# Patient Record
(demographics unavailable — no encounter records)

---

## 2024-12-05 NOTE — REASON FOR VISIT
[Initial Evaluation] : an initial evaluation [Patient] : patient [Father] : father [FreeTextEntry1] : knocked knees

## 2024-12-05 NOTE — HISTORY OF PRESENT ILLNESS
[FreeTextEntry1] : 12 year old male patient ("Lopez") seen for orthopaedic evaluation of knocked knees. Dad has noticed for the past 2 years.  Dad denies knocked knees in any family members. Denies pain in his legs.

## 2024-12-05 NOTE — PHYSICAL EXAM
[FreeTextEntry1] : Gait: Presents ambulating independently without signs of antalgia.  Good coordination and balance noted. Plantigrade foot with heel-to-toe progression. Neutral foot progression angle. GENERAL: Healthy appearing. Alert, cooperative, in NAD SKIN: The skin is intact, warm, pink and dry over the area examined. EYES: Normal conjunctiva, normal eyelids and pupils were equal and round. ENT: normal ears, normal nose and normal lips. CARDIOVASCULAR: brisk capillary refill, but no peripheral edema. RESPIRATORY: The patient is in no apparent respiratory distress. They're taking full deep breaths without use of accessory muscles or evidence of audible wheezes or stridor without the use of a stethoscope. Normal respiratory effort. ABDOMEN: not examined MUSCULOSKELETAL: valgus alignment worse on the right than the left

## 2024-12-05 NOTE — DATA REVIEWED
[de-identified] : Leg length XRs taken at [facility] on 12/5/24 were viewed and independently interpreted: genu valgum BL, MAD passes through zone 2 laterally BL R LDFA measures 83.15, R MPTA measures 86.8 L LDFA measures 84.37, R MPTA measures 89.16  AP Lateral Knee XR taken in office on 12/5/24  and were viewed and independently interpreted: growth plates open; unable to determine alignment

## 2024-12-05 NOTE — END OF VISIT
[FreeTextEntry3] : A physician assistant/resident assisted with documenting the visit and acted as a scribe. I have seen and examined the patient, made my assessment and plan and have made all modifications necessary to the note.  Lauren Paulson MD Pediatric Orthopaedics Surgery [facility]  [Time Spent: ___ minutes] : I have spent [unfilled] minutes of time on the encounter which excludes teaching and separately reported services.

## 2024-12-05 NOTE — ASSESSMENT
[FreeTextEntry1] :  12 year old male patient with genu valgum.   Today's visit included obtaining the history from the child and parent, due to the child's age, the child could not be considered a reliable historian, requiring the parent to act as an independent historian. The condition, natural history, and prognosis were explained to the patient and family. The clinical findings and images were reviewed with the family.  AP Lateral Knee XR taken in office on 12/5/24  and were viewed and independently interpreted: growth plates open; unable to determine alignment  I have asked pt to obtain a leg length XR from the hospital. I emphasized the importance of standing with both of his knees straight forward. A script for this was provided. Pt can follow-up with me after the XR. Options including arlyn epiphysiodesis were discussed.   ADDENDUM: Leg length XRs taken at [facility] on 12/5/24 were viewed and independently interpreted: genu valgum BL, MAD passes through zone 2 laterally BL R LDFA measures 83.15, R MPTA measures 86.8 L LDFA measures 84.37, R MPTA measures 89.16  Discussed with patient's family. Recommend surgical intervention as an option for clinical correction, would include BL medial distal femoral epiphysiodesis.  This plan was discussed with the family. Family verbalizes understanding and agreement of plan. All questions and concerns were addressed today.  I, LORAINE ESPINOSA, acted as a scribe on behalf of DR. ABEL on 12/05/2024.

## 2024-12-19 NOTE — ASSESSMENT
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   Today's visit included obtaining the history from the child and parent, due to the child's age, the child could not be considered a reliable historian, requiring the parent to act as an independent historian. The condition, natural history, and prognosis were explained to the patient and family. The clinical findings and images were reviewed with the family.  I went over patient's leg length XRs from 12/5/24. My recommendation is surgical intervention for clinical correction including BL medial distal femoral epiphysiodesis. Benefits, risks, and expected outcomes were discussed in detail. We discussed that he would have 4 weeks of limited activity. I explained that the correction would occur when the patient is growing. Father would like to proceed with surgical intervention. Dad is going for surgery in January and would like to schedule pt's surgery in early to mid-February at [facility].   This plan was discussed with the family. Family verbalizes understanding and agreement of plan. All questions and concerns were addressed today.  I, LORAINE ESPINOSA, acted as a scribe on behalf of DR. ABEL on 12/19/2024.

## 2024-12-19 NOTE — HISTORY OF PRESENT ILLNESS
[FreeTextEntry1] : 12 year old male patient ("Lopez") seen for orthopaedic evaluation of knocked knees. Dad has noticed for the past 2 years.  Dad denies knocked knees in any family members. Denies pain in his legs.   12/19/24 -- Pt seen in follow-up. He is doing well and comes in to discuss options today. Denies any complications with anesthesia in the past. Denies any medical conditions. I explained that pt would need medical clearance from his pediatrician.

## 2024-12-19 NOTE — PHYSICAL EXAM
[FreeTextEntry1] : Gait: Presents ambulating independently without signs of antalgia.  Good coordination and balance noted. Plantigrade foot with heel-to-toe progression. Neutral foot progression angle. GENERAL: Healthy appearing. Alert, cooperative, in NAD SKIN: The skin is intact, warm, pink and dry over the area examined. EYES: Normal conjunctiva, normal eyelids and pupils were equal and round. ENT: normal ears, normal nose and normal lips. CARDIOVASCULAR: brisk capillary refill, but no peripheral edema. RESPIRATORY: The patient is in no apparent respiratory distress. They're taking full deep breaths without use of accessory muscles or evidence of audible wheezes or stridor without the use of a stethoscope. Normal respiratory effort. ABDOMEN: not examined MUSCULOSKELETAL: clinical valgus alignment worse on the right than the left

## 2024-12-19 NOTE — END OF VISIT
[Time Spent: ___ minutes] : I have spent [unfilled] minutes of time on the encounter which excludes teaching and separately reported services. [FreeTextEntry3] : A physician assistant/resident assisted with documenting the visit and acted as a scribe. I have seen and examined the patient, made my assessment and plan and have made all modifications necessary to the note.  Lauren Paulson MD Pediatric Orthopaedics Surgery [facility]

## 2024-12-19 NOTE — REASON FOR VISIT
[Follow Up] : a follow up visit [Patient] : patient [Father] : father [FreeTextEntry1] : knocked knees

## 2024-12-19 NOTE — DATA REVIEWED
[de-identified] : Leg length XRs taken at [facility] on 12/5/24 were viewed and independently interpreted: genu valgum BL, MAD passes through zone 2 laterally BL R LDFA measures 83.15, R MPTA measures 86.8 L LDFA measures 84.37, R MPTA measures 89.16  AP Lateral Knee XR taken in office on 12/5/24  and were viewed and independently interpreted: growth plates open; unable to determine alignment

## 2025-03-05 NOTE — POST OP
[___ Weeks Post Op] : [unfilled] weeks post op [Doing Well] : is doing well [Excellent Pain Control] : has excellent pain control [No Sign of Infection] : is showing no signs of infection [de-identified] : s/p right medial distal femoral arlyn epiphysiodesis, left medial distal femoral arlyn epiphysiodesis, left medial proximal tibia arlyn epiphysiodesis on 02/18/2025. [de-identified] : 12-year-old male, who presented to my office in December 2024 due to complaints of bilateral knock knee deformities.  X-rays were taken and demonstrated bilateral valgus deformities. Radiographs demonstrated that the valgus deformity was coming from the right distal femur, the left distal femur and the left proximal tibia.  Due to his x-ray findings and his skeletal immaturity, recommendation was made at that time to proceed with surgical intervention for arlyn epiphysiodesis.  The risks and benefits of surgery including, but not limited to, infection, bleeding, injury to neurovascular structures, pain, scarring, stiffness, incomplete correction, and need for additional procedures including removal of hardware was discussed with the patient's family.  They wished to proceed.  Today father reports that he is doing well. Denies any significant discomfort or pain. Denies any radiating pain or tingling sensation.  He is able to bear weight on his bilateral lower extremities. Denies any recent fevers, chills or night sweats. He is not taking any pain medication. Here for further orthopedic evaluation. [de-identified] : Bilateral lower extremities: -Incision is healing well, no signs of infection noted. -No gross deformity. -No swelling, warmth, or erythema. -All thigh compartments are soft and easily compressible. -ROM 0-115 degrees. -No knee joint effusion -Foot is warm and appears well-perfused with brisk capillary refill to all toes -2+ dorsalis pedis pulse -Sensation is grossly intact throughout lower extremity including in the deep peroneal, superficial peroneal, saphenous, sural, and tibial nerve distributions -No evidence of lymphedema.   [de-identified] : AP, lateral and oblique bilateral knee radiographs were ordered, obtained, and independently reviewed in clinic on 03/04/25 s/p bilateral arlyn epiphysiodesis. Hardware is in place. [de-identified] : 12-year-old male with 2 weeks s/p right medial distal femoral arlyn epiphysiodesis, left medial distal femoral arlyn epiphysiodesis, left medial proximal tibia arlyn epiphysiodesis on 02/18/2025. [de-identified] : Today's visit included obtaining the history from the child and parent, due to the child's age, the child could not be considered a reliable historian, requiring the parent to act as an independent historian. The condition, natural history, and prognosis were explained to the patient and family. The clinical findings and images were reviewed with the family. Clinically he is doing well no signs of infection noted. Weight bearing as tolerated. Recommended for gentle ROM. No gym, no sports, rough play until cleared by our clinic. Updated school note was provided. We will plan to see him back in 2 weeks 03/21 ([place]) for reevaluation of ROM prior to activity clearance.  All questions and concerns were addressed. Patient and parent vocalized understanding and agreement to assessment and treatment.  Rosey BELCHER have acted as scribe and documented the above for Dr. Paulson

## 2025-03-05 NOTE — END OF VISIT
[FreeTextEntry3] : A physician assistant/resident assisted with documenting the visit and acted as a scribe. I have seen and examined the patient, made my assessment and plan and have made all modifications necessary to the note.  Lauren Paulson MD Pediatric Orthopaedics Surgery [facility]

## 2025-03-05 NOTE — END OF VISIT
[FreeTextEntry3] : A physician assistant/resident assisted with documenting the visit and acted as a scribe. I have seen and examined the patient, made my assessment and plan and have made all modifications necessary to the note.  Lauren Paulson MD Pediatric Orthopaedics Surgery [facility]

## 2025-03-21 NOTE — ASSESSMENT
[FreeTextEntry1] : 12-year-old male with 2 weeks s/p right medial distal femoral arlyn epiphysiodesis, left medial distal femoral arlyn epiphysiodesis, left medial proximal tibia arlyn epiphysiodesis on 02/18/2025. Postoperatively, the patient is doing well, has excellent pain control and is showing no signs of infection.   Plan: Today's visit included obtaining the history from the child and parent, due to the child's age, the child could not be considered a reliable historian, requiring the parent to act as an independent historian. The condition, natural history, and prognosis were explained to the patient and family. The clinical findings and images were reviewed with the family. Clinically he is doing well no signs of infection noted. Weight bearing as tolerated. Cleared for sports and gym today. Updated school note was provided. We will plan to see him back in 4 months for reevaluation and alignment films of bilateral lower extremities.  All questions and concerns were addressed. Patient and parent vocalized understanding and agreement to assessment and treatment.  IAnna MD, have acted as scribe and documented the above for Dr. Paulson.

## 2025-03-21 NOTE — END OF VISIT
[FreeTextEntry3] : A physician assistant/resident assisted with documenting the visit and acted as a scribe. I have seen and examined the patient, made my assessment and plan and have made all modifications necessary to the note.  Laurne Paulson MD Pediatric Orthopaedics Surgery [facility]

## 2025-03-21 NOTE — HISTORY OF PRESENT ILLNESS
[FreeTextEntry1] : 12-year-old male, who presented to my office in December 2024 due to complaints of bilateral knock knee deformities. X-rays were taken and demonstrated bilateral valgus deformities. Radiographs demonstrated that the valgus deformity was coming from the right distal femur, the left distal femur and the left proximal tibia. Due to his x-ray findings and his skeletal immaturity, recommendation was made at that time to proceed with surgical intervention for arlyn epiphysiodesis. The risks and benefits of surgery including, but not limited to, infection, bleeding, injury to neurovascular structures, pain, scarring, stiffness, incomplete correction, and need for additional procedures including removal of hardware was discussed with the patient's family. Patient underwent R medial distal femoral hemiepiphysiodesis, Left medial distal femur/proximal tibia hemiepiphysiodesis on 2/18/25. Last visit patient was doing well, minimal pain, continued activity modification.  Please refer to last note from previous treatment and further details.  Today patient and father report that he is doing well. Denies any significant discomfort or pain. Denies any radiating pain or tingling sensation. He is able to bear weight on his bilateral lower extremities. Denies any recent fevers, chills or night sweats. He is not taking any pain medication. No pain with ROM of bilateral knees.

## 2025-03-21 NOTE — END OF VISIT
[FreeTextEntry3] : A physician assistant/resident assisted with documenting the visit and acted as a scribe. I have seen and examined the patient, made my assessment and plan and have made all modifications necessary to the note.  Lauren Paulson MD Pediatric Orthopaedics Surgery [facility]

## 2025-03-21 NOTE — POST OP
[___ Weeks Post Op] : [unfilled] weeks post op [Doing Well] : is doing well [Excellent Pain Control] : has excellent pain control [No Sign of Infection] : is showing no signs of infection [de-identified] : s/p right medial distal femoral arlyn epiphysiodesis, left medial distal femoral arlyn epiphysiodesis, left medial proximal tibia arlyn epiphysiodesis on 02/18/2025. [de-identified] : 12-year-old male, who presented to my office in December 2024 due to complaints of bilateral knock knee deformities.  X-rays were taken and demonstrated bilateral valgus deformities. Radiographs demonstrated that the valgus deformity was coming from the right distal femur, the left distal femur and the left proximal tibia.  Due to his x-ray findings and his skeletal immaturity, recommendation was made at that time to proceed with surgical intervention for arlyn epiphysiodesis.  The risks and benefits of surgery including, but not limited to, infection, bleeding, injury to neurovascular structures, pain, scarring, stiffness, incomplete correction, and need for additional procedures including removal of hardware was discussed with the patient's family.  They wished to proceed.  Today father reports that he is doing well. Denies any significant discomfort or pain. Denies any radiating pain or tingling sensation.  He is able to bear weight on his bilateral lower extremities. Denies any recent fevers, chills or night sweats. He is not taking any pain medication. Here for further orthopedic evaluation. [de-identified] : Bilateral lower extremities: -Incision is healing well, no signs of infection noted. -No gross deformity. -No swelling, warmth, or erythema. -All thigh compartments are soft and easily compressible. -ROM 0-115 degrees. -No knee joint effusion -Foot is warm and appears well-perfused with brisk capillary refill to all toes -2+ dorsalis pedis pulse -Sensation is grossly intact throughout lower extremity including in the deep peroneal, superficial peroneal, saphenous, sural, and tibial nerve distributions -No evidence of lymphedema.   [de-identified] : AP, lateral and oblique bilateral knee radiographs were ordered, obtained, and independently reviewed in clinic on 03/04/25 s/p bilateral arlyn epiphysiodesis. Hardware is in place. [de-identified] : 12-year-old male with 2 weeks s/p right medial distal femoral arlyn epiphysiodesis, left medial distal femoral arlyn epiphysiodesis, left medial proximal tibia arlyn epiphysiodesis on 02/18/2025. [de-identified] : Today's visit included obtaining the history from the child and parent, due to the child's age, the child could not be considered a reliable historian, requiring the parent to act as an independent historian. The condition, natural history, and prognosis were explained to the patient and family. The clinical findings and images were reviewed with the family. Clinically he is doing well no signs of infection noted. Weight bearing as tolerated. Recommended for gentle ROM. No gym, no sports, rough play until cleared by our clinic. Updated school note was provided. We will plan to see him back in 2 weeks 03/21 ([place]) for reevaluation of ROM prior to activity clearance.  All questions and concerns were addressed. Patient and parent vocalized understanding and agreement to assessment and treatment.  Rosey BELCHER have acted as scribe and documented the above for Dr. Paulson

## 2025-03-21 NOTE — POST OP
[___ Weeks Post Op] : [unfilled] weeks post op [Doing Well] : is doing well [Excellent Pain Control] : has excellent pain control [No Sign of Infection] : is showing no signs of infection [de-identified] : s/p right medial distal femoral arlyn epiphysiodesis, left medial distal femoral arlyn epiphysiodesis, left medial proximal tibia arlyn epiphysiodesis on 02/18/2025. [de-identified] : 12-year-old male, who presented to my office in December 2024 due to complaints of bilateral knock knee deformities.  X-rays were taken and demonstrated bilateral valgus deformities. Radiographs demonstrated that the valgus deformity was coming from the right distal femur, the left distal femur and the left proximal tibia.  Due to his x-ray findings and his skeletal immaturity, recommendation was made at that time to proceed with surgical intervention for arlyn epiphysiodesis.  The risks and benefits of surgery including, but not limited to, infection, bleeding, injury to neurovascular structures, pain, scarring, stiffness, incomplete correction, and need for additional procedures including removal of hardware was discussed with the patient's family.  They wished to proceed.  Today father reports that he is doing well. Denies any significant discomfort or pain. Denies any radiating pain or tingling sensation.  He is able to bear weight on his bilateral lower extremities. Denies any recent fevers, chills or night sweats. He is not taking any pain medication. Here for further orthopedic evaluation. [de-identified] : Bilateral lower extremities: -Incision is healing well, no signs of infection noted. -No gross deformity. -No swelling, warmth, or erythema. -All thigh compartments are soft and easily compressible. -ROM 0-115 degrees. -No knee joint effusion -Foot is warm and appears well-perfused with brisk capillary refill to all toes -2+ dorsalis pedis pulse -Sensation is grossly intact throughout lower extremity including in the deep peroneal, superficial peroneal, saphenous, sural, and tibial nerve distributions -No evidence of lymphedema.   [de-identified] : AP, lateral and oblique bilateral knee radiographs were ordered, obtained, and independently reviewed in clinic on 03/04/25 s/p bilateral arlyn epiphysiodesis. Hardware is in place. [de-identified] : 12-year-old male with 2 weeks s/p right medial distal femoral arlyn epiphysiodesis, left medial distal femoral arlyn epiphysiodesis, left medial proximal tibia arlyn epiphysiodesis on 02/18/2025. [de-identified] : Today's visit included obtaining the history from the child and parent, due to the child's age, the child could not be considered a reliable historian, requiring the parent to act as an independent historian. The condition, natural history, and prognosis were explained to the patient and family. The clinical findings and images were reviewed with the family. Clinically he is doing well no signs of infection noted. Weight bearing as tolerated. Recommended for gentle ROM. No gym, no sports, rough play until cleared by our clinic. Updated school note was provided. We will plan to see him back in 2 weeks 03/21 ([place]) for reevaluation of ROM prior to activity clearance.  All questions and concerns were addressed. Patient and parent vocalized understanding and agreement to assessment and treatment.  Rosey BELCHER have acted as scribe and documented the above for Dr. Paulson

## 2025-03-21 NOTE — PHYSICAL EXAM
[FreeTextEntry1] : Bilateral lower extremities: -Incision is healing well, no signs of infection noted, no sensitivity of scars -No gross deformity. -No swelling, warmth, or erythema. -All thigh compartments are soft and easily compressible. -ROM: R Knee 0-140 L Knee 0-135 -No knee joint effusion -Foot is warm and appears well-perfused with brisk capillary refill to all toes -2+ dorsalis pedis pulse -Sensation is grossly intact throughout lower extremity including in the deep peroneal, superficial peroneal, saphenous, sural, and tibial nerve distributions -No evidence of lymphedema.

## 2025-03-21 NOTE — REVIEW OF SYSTEMS
[Change in Activity] : no change in activity [Rash] : no rash [Tachypnea] : no tachypnea [Kidney Infection] : no kidney infection [Limping] : no limping

## 2025-07-25 NOTE — POST OP
[de-identified] : s/p right medial distal femoral arlyn epiphysiodesis, left medial distal femoral arlyn epiphysiodesis, left medial proximal tibia arlyn epiphysiodesis on 02/18/2025. [de-identified] : 13-year-old male, who presented to my office in December 2024 due to complaints of bilateral knock knee deformities.  X-rays were taken and demonstrated bilateral valgus deformities. Radiographs demonstrated that the valgus deformity was coming from the right distal femur, the left distal femur and the left proximal tibia.  Due to his x-ray findings and his skeletal immaturity, recommendation was made at that time to proceed with surgical intervention for arlyn epiphysiodesis.  The risks and benefits of surgery including, but not limited to, infection, bleeding, injury to neurovascular structures, pain, scarring, stiffness, incomplete correction, and need for additional procedures including removal of hardware was discussed with the patient's family.  They wished to proceed.  He returns today for f/u. He is doing very well. Family has noticed some improvement of his alignment. [de-identified] : Bilateral lower extremities: -Incision are well healed NVI distally [de-identified] : AP leg length XRs taken in office on 7/25/25 were viewed and independently interpreted: + hardware in place, + increased screw divergence, + improvement of alignment, L leg MAD is almost fully corrected. R leg MAD is in zone 1 laterally. [de-identified] : 13-year-old male s/p right medial distal femoral arlyn epiphysiodesis, left medial distal femoral arlyn epiphysiodesis, left medial proximal tibia arlyn epiphysiodesis on 02/18/2025. [de-identified] : - Overall he is doing very well - He has had some correction of both legs. The left leg is almost completely corrected. The right leg has improved but not fully corrected - Recommend continued observation of correction - F/u 3 months- leg length XR, if full correction may require JAMES.  All questions were answered, the family expresses understanding and agrees with the plan of care.

## 2025-07-25 NOTE — END OF VISIT
[FreeTextEntry3] : A physician assistant/resident assisted with documenting the visit and acted as a scribe. I have seen and examined the patient, made my assessment and plan and have made all modifications necessary to the note.  Lauren Paulson MD Pediatric Orthopaedics Surgery [facility]